# Patient Record
Sex: MALE | URBAN - METROPOLITAN AREA
[De-identification: names, ages, dates, MRNs, and addresses within clinical notes are randomized per-mention and may not be internally consistent; named-entity substitution may affect disease eponyms.]

---

## 2022-06-23 ENCOUNTER — TELEPHONE (OUTPATIENT)
Dept: PEDIATRICS | Facility: CLINIC | Age: 22
End: 2022-06-23

## 2022-06-23 NOTE — TELEPHONE ENCOUNTER
Notified last PPD placed 6/2017. Informed typically needed within a year. Can place here? Patient states he just moved to [place]. Will look into it in the area if needed.    ----- Message from Michael Lowe sent at 6/23/2022 11:09 AM CDT -----  Regarding: pt question.  Contact: pt [phone]  PT wants to know when was the last time he received a TB Vaccine. Claims he got one before.